# Patient Record
Sex: MALE | Race: BLACK OR AFRICAN AMERICAN | Employment: OTHER | URBAN - METROPOLITAN AREA
[De-identification: names, ages, dates, MRNs, and addresses within clinical notes are randomized per-mention and may not be internally consistent; named-entity substitution may affect disease eponyms.]

---

## 2019-02-25 ENCOUNTER — TRANSFERRED RECORDS (OUTPATIENT)
Dept: HEALTH INFORMATION MANAGEMENT | Facility: CLINIC | Age: 69
End: 2019-02-25

## 2019-03-18 ENCOUNTER — TELEPHONE (OUTPATIENT)
Dept: ONCOLOGY | Facility: CLINIC | Age: 69
End: 2019-03-18

## 2019-03-18 ENCOUNTER — ANCILLARY PROCEDURE (OUTPATIENT)
Dept: GENERAL RADIOLOGY | Facility: CLINIC | Age: 69
End: 2019-03-18
Attending: INTERNAL MEDICINE
Payer: MEDICAID

## 2019-03-18 ENCOUNTER — OFFICE VISIT (OUTPATIENT)
Dept: INTERNAL MEDICINE | Facility: CLINIC | Age: 69
End: 2019-03-18
Payer: MEDICAID

## 2019-03-18 VITALS
OXYGEN SATURATION: 90 % | DIASTOLIC BLOOD PRESSURE: 80 MMHG | TEMPERATURE: 98.8 F | HEIGHT: 66 IN | RESPIRATION RATE: 20 BRPM | BODY MASS INDEX: 33.07 KG/M2 | SYSTOLIC BLOOD PRESSURE: 136 MMHG | HEART RATE: 90 BPM | WEIGHT: 205.8 LBS

## 2019-03-18 DIAGNOSIS — Z13.6 CARDIOVASCULAR SCREENING; LDL GOAL LESS THAN 130: ICD-10-CM

## 2019-03-18 DIAGNOSIS — R06.00 DYSPNEA, UNSPECIFIED TYPE: Primary | ICD-10-CM

## 2019-03-18 DIAGNOSIS — Z12.11 COLON CANCER SCREENING: ICD-10-CM

## 2019-03-18 DIAGNOSIS — D49.7 FOLLICULAR NEOPLASM OF THYROID: ICD-10-CM

## 2019-03-18 DIAGNOSIS — Z23 NEED FOR PROPHYLACTIC VACCINATION AGAINST STREPTOCOCCUS PNEUMONIAE (PNEUMOCOCCUS): ICD-10-CM

## 2019-03-18 DIAGNOSIS — R06.00 DYSPNEA, UNSPECIFIED TYPE: ICD-10-CM

## 2019-03-18 LAB
T4 FREE SERPL-MCNC: 0.96 NG/DL (ref 0.76–1.46)
TSH SERPL DL<=0.005 MIU/L-ACNC: 0.39 MU/L (ref 0.4–4)

## 2019-03-18 PROCEDURE — 84443 ASSAY THYROID STIM HORMONE: CPT | Performed by: INTERNAL MEDICINE

## 2019-03-18 PROCEDURE — 84439 ASSAY OF FREE THYROXINE: CPT | Performed by: INTERNAL MEDICINE

## 2019-03-18 PROCEDURE — 71046 X-RAY EXAM CHEST 2 VIEWS: CPT

## 2019-03-18 PROCEDURE — 36415 COLL VENOUS BLD VENIPUNCTURE: CPT | Performed by: INTERNAL MEDICINE

## 2019-03-18 PROCEDURE — 99204 OFFICE O/P NEW MOD 45 MIN: CPT | Performed by: INTERNAL MEDICINE

## 2019-03-18 RX ORDER — ALBUTEROL SULFATE 90 UG/1
2 AEROSOL, METERED RESPIRATORY (INHALATION) EVERY 6 HOURS PRN
Qty: 8.5 G | Refills: 1 | Status: SHIPPED | OUTPATIENT
Start: 2019-03-18 | End: 2019-06-16

## 2019-03-18 SDOH — HEALTH STABILITY: MENTAL HEALTH: HOW OFTEN DO YOU HAVE A DRINK CONTAINING ALCOHOL?: NEVER

## 2019-03-18 ASSESSMENT — MIFFLIN-ST. JEOR: SCORE: 1632.28

## 2019-03-18 NOTE — TELEPHONE ENCOUNTER
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 No, internal referral    Has patient been seen for any external appt for this diagnosis (enter clinic/location)? No    ADDITIONAL INFORMATION:      Complete

## 2019-03-18 NOTE — PROGRESS NOTES
SUBJECTIVE:   Jacoby Ramos is a 69 year old male who presents to clinic today for the following health issues:    New patient, not seen prior. Patient accompanied by daughter who states patient moved here from [facility] 8+ yrs ago, has not had a routine provider since being in [place]    Due to language barrier, an  was present during the history-taking and subsequent discussion (and for part of the physical exam) with this patient.    Very poor historian.    Through the  the patient denies any prior history of asthma, COPD and has not been a smoker.  He has not been exposed to anybody with tuberculosis and has had no significant productive cough of discoloration or hemoptysis.  There is been no weight loss.  He apparently was given some form of an allergy pill in [facility] which she did not feel helped his symptoms.  He reports some symptoms that are consistent with reflux disease with belching burping and food intolerance to certain issues as well as mild symptomatology it is worse when he lays supine.    RESPIRATORY SYMPTOMS      Duration: 6 months     Description  cough, wheezing, chills, fatigue/malaise    Severity: moderate    Accompanying signs and symptoms: Spitting up a lot of phlegm     History (predisposing factors):  none    Precipitating or alleviating factors: Patient was in [place] for the last 6 months after his son passed away    Therapies tried and outcome:  Treated for allergies in [place]     Other concern:  1. Daughter also mentions that patient had thyroid surgery in [facility] many yrs ago.    Just prior to ending the clinic visit the daughter pulled out a piece of paper for which was from a physician in [place] written in English but stated that the patient apparently was seen over there and evaluated at which time a CT scan was done of his neck demonstrated a right-sided thyroid mass for which a fine-needle aspirate was done consistent with follicular neoplasm.    Problem  "list and histories reviewed & adjusted, as indicated.  Additional history: as documented    Patient Active Problem List   Diagnosis     CARDIOVASCULAR SCREENING; LDL GOAL LESS THAN 130     No past surgical history on file.    Social History     Tobacco Use     Smoking status: Not on file   Substance Use Topics     Alcohol use: Not on file     No family history on file.      No current outpatient medications on file.     Allergies not on file  BP Readings from Last 3 Encounters:   No data found for BP    Wt Readings from Last 3 Encounters:   No data found for Wt           Reviewed and updated as needed this visit by clinical staff       Reviewed and updated as needed this visit by Provider         ROS:  CONSTITUTIONAL: NEGATIVE for fever, chills, change in weight  ENT/MOUTH: NEGATIVE for ear, mouth and throat problems  RESP:  As above   CV: NEGATIVE for chest pain, palpitations or peripheral edema  GI: NEGATIVE for nausea, abdominal pain, + heartburn, or change in bowel habits  : NEGATIVE for frequency, dysuria, or hematuria  MUSCULOSKELETAL: NEGATIVE for significant arthralgias or myalgia  HEME: NEGATIVE for bleeding problems  PSYCHIATRIC: NEGATIVE for changes in mood or affect    OBJECTIVE:                                                    /80   Pulse 90   Temp 98.8  F (37.1  C) (Oral)   Resp 20   Ht 1.67 m (5' 5.75\")   Wt 93.4 kg (205 lb 12.8 oz)   SpO2 90%   BMI 33.47 kg/m    Body mass index is 33.47 kg/m .  GENERAL: alert and no distress  EYES: Eyes grossly normal to inspection, extraocular movements - intact, and PERRL  HENT: ear canals- normal; TMs- normal; Nose- normal; Mouth- no ulcers, no lesions  NECK: no tenderness, noted prominent right sided thyroid area with question mild adenopathy, no asymmetry, no stiffness; thyroid- not normal to palpation  RESP: lungs clear to auscultation - no rales, no rhonchi, no wheezes  CV: regular rates and rhythm, normal S1 S2, no S3 or S4 and no click or " rub   MS: extremities- no gross deformities noted  NEURO: no focal changes  PSYCH: Alert and oriented times 3; speech- coherent , normal rate and volume; able to articulate logical thoughts, able to abstract reason, no tangential thoughts, no hallucinations or delusions, affect- normal     ASSESSMENT/PLAN:                                                      (R06.00) Dyspnea, unspecified type  (primary encounter diagnosis)  Comment: Suspect related to below neoplasm of right sided neck.  Plan: Echocardiogram Complete, XR Chest 2 Views, TSH         with free T4 reflex, albuterol (PROAIR         HFA/PROVENTIL HFA/VENTOLIN HFA) 108 (90 Base)         MCG/ACT inhaler, omeprazole (PRILOSEC) 20 MG DR        capsule        We will rule out secondary causes.    (Z12.11) Colon cancer screening  Comment: ADVISED COLONOSCOPY FOR ROUTINE PREVENTATIVE CARE.  Plan: GASTROENTEROLOGY ADULT REF PROCEDURE ONLY         Akhil Hernandez [phone]; No Provider        Preference            (Z23) Need for prophylactic vaccination against Streptococcus pneumoniae (pneumococcus)  Comment: Vised for routine vaccination update  Plan:     (Z13.6) CARDIOVASCULAR SCREENING; LDL GOAL LESS THAN 130  Comment: Advise routine fasting labs.  Plan:     (D49.7) Follicular neoplasm of thyroid  Comment: Patient was given a referral to oncology for evaluation and further assessment.  Plan: ONC/HEME ADULT REFERRAL, I suspect that a lot of his symptoms may be related to his follicular neoplasm that may be giving him some upper airway issues.  Of note I do not have a copy of the CT scan or biopsy results that apparently were done in [place].          See Patient Instructions    Afshin Baptiste MD  [facility]

## 2019-03-19 ENCOUNTER — HOSPITAL ENCOUNTER (OUTPATIENT)
Facility: CLINIC | Age: 69
Setting detail: SPECIMEN
Discharge: HOME | End: 2019-03-19
Attending: INTERNAL MEDICINE | Admitting: INTERNAL MEDICINE
Payer: MEDICAID

## 2019-03-19 ENCOUNTER — ONCOLOGY VISIT (OUTPATIENT)
Dept: ONCOLOGY | Facility: CLINIC | Age: 69
End: 2019-03-19
Attending: INTERNAL MEDICINE
Payer: MEDICAID

## 2019-03-19 ENCOUNTER — PRE VISIT (OUTPATIENT)
Dept: ONCOLOGY | Facility: CLINIC | Age: 69
End: 2019-03-19

## 2019-03-19 ENCOUNTER — TELEPHONE (OUTPATIENT)
Dept: ONCOLOGY | Facility: CLINIC | Age: 69
End: 2019-03-19

## 2019-03-19 VITALS
BODY MASS INDEX: 32.95 KG/M2 | RESPIRATION RATE: 16 BRPM | TEMPERATURE: 98.4 F | DIASTOLIC BLOOD PRESSURE: 82 MMHG | HEART RATE: 96 BPM | HEIGHT: 66 IN | OXYGEN SATURATION: 90 % | WEIGHT: 205 LBS | SYSTOLIC BLOOD PRESSURE: 135 MMHG

## 2019-03-19 DIAGNOSIS — D49.7 FOLLICULAR NEOPLASM OF THYROID: Primary | ICD-10-CM

## 2019-03-19 LAB
ALBUMIN SERPL-MCNC: 3.7 G/DL (ref 3.4–5)
ALP SERPL-CCNC: 116 U/L (ref 40–150)
ALT SERPL W P-5'-P-CCNC: 33 U/L (ref 0–70)
ANION GAP SERPL CALCULATED.3IONS-SCNC: 6 MMOL/L (ref 3–14)
AST SERPL W P-5'-P-CCNC: 19 U/L (ref 0–45)
BASOPHILS # BLD AUTO: 0 10E9/L (ref 0–0.2)
BASOPHILS NFR BLD AUTO: 0.3 %
BILIRUB SERPL-MCNC: 0.6 MG/DL (ref 0.2–1.3)
BUN SERPL-MCNC: 11 MG/DL (ref 7–30)
CALCIUM SERPL-MCNC: 9.2 MG/DL (ref 8.5–10.1)
CHLORIDE SERPL-SCNC: 106 MMOL/L (ref 94–109)
CO2 SERPL-SCNC: 30 MMOL/L (ref 20–32)
CREAT SERPL-MCNC: 0.74 MG/DL (ref 0.66–1.25)
DIFFERENTIAL METHOD BLD: ABNORMAL
EOSINOPHIL # BLD AUTO: 0.2 10E9/L (ref 0–0.7)
EOSINOPHIL NFR BLD AUTO: 2.5 %
ERYTHROCYTE [DISTWIDTH] IN BLOOD BY AUTOMATED COUNT: 14.1 % (ref 10–15)
GFR SERPL CREATININE-BSD FRML MDRD: >90 ML/MIN/{1.73_M2}
GLUCOSE SERPL-MCNC: 120 MG/DL (ref 70–99)
HCT VFR BLD AUTO: 54 % (ref 40–53)
HGB BLD-MCNC: 18.1 G/DL (ref 13.3–17.7)
IMM GRANULOCYTES # BLD: 0 10E9/L (ref 0–0.4)
IMM GRANULOCYTES NFR BLD: 0.1 %
LYMPHOCYTES # BLD AUTO: 1.8 10E9/L (ref 0.8–5.3)
LYMPHOCYTES NFR BLD AUTO: 25.6 %
MCH RBC QN AUTO: 31 PG (ref 26.5–33)
MCHC RBC AUTO-ENTMCNC: 33.5 G/DL (ref 31.5–36.5)
MCV RBC AUTO: 93 FL (ref 78–100)
MONOCYTES # BLD AUTO: 0.5 10E9/L (ref 0–1.3)
MONOCYTES NFR BLD AUTO: 7.3 %
NEUTROPHILS # BLD AUTO: 4.6 10E9/L (ref 1.6–8.3)
NEUTROPHILS NFR BLD AUTO: 64.2 %
NRBC # BLD AUTO: 0 10*3/UL
NRBC BLD AUTO-RTO: 0 /100
PLATELET # BLD AUTO: 211 10E9/L (ref 150–450)
POTASSIUM SERPL-SCNC: 4.3 MMOL/L (ref 3.4–5.3)
PROT SERPL-MCNC: 7 G/DL (ref 6.8–8.8)
RBC # BLD AUTO: 5.83 10E12/L (ref 4.4–5.9)
SODIUM SERPL-SCNC: 142 MMOL/L (ref 133–144)
WBC # BLD AUTO: 7.1 10E9/L (ref 4–11)

## 2019-03-19 PROCEDURE — 85025 COMPLETE CBC W/AUTO DIFF WBC: CPT | Performed by: INTERNAL MEDICINE

## 2019-03-19 PROCEDURE — 86800 THYROGLOBULIN ANTIBODY: CPT | Performed by: INTERNAL MEDICINE

## 2019-03-19 PROCEDURE — 80053 COMPREHEN METABOLIC PANEL: CPT | Performed by: INTERNAL MEDICINE

## 2019-03-19 PROCEDURE — 99204 OFFICE O/P NEW MOD 45 MIN: CPT | Performed by: INTERNAL MEDICINE

## 2019-03-19 PROCEDURE — 84432 ASSAY OF THYROGLOBULIN: CPT | Performed by: INTERNAL MEDICINE

## 2019-03-19 PROCEDURE — G0463 HOSPITAL OUTPT CLINIC VISIT: HCPCS

## 2019-03-19 ASSESSMENT — MIFFLIN-ST. JEOR: SCORE: 1628.65

## 2019-03-19 ASSESSMENT — PAIN SCALES - GENERAL: PAINLEVEL: NO PAIN (0)

## 2019-03-19 NOTE — PATIENT INSTRUCTIONS
CT scan scheduled  Sandra will call patient daughter Jackie [phone]  to schedule ENT and Endocrinology Appts

## 2019-03-19 NOTE — TELEPHONE ENCOUNTER
RECORDS STATUS - ALL OTHER DIAGNOSIS      RECORDS RECEIVED FROM: [facility] - in [facility] (internal referral) *Previous records from [facility] unavailable*   DATE RECEIVED: 3/18/19   NOTES STATUS DETAILS   OFFICE NOTE from referring provider 3/18/19 In [facility]     OFFICE NOTE from medical oncologist None None     DISCHARGE SUMMARY from hospital None None     DISCHARGE REPORT from the ER None None     OPERATIVE REPORT None None     MEDICATION LIST In Epic In [facility]     CLINICAL TRIAL TREATMENTS TO DATE None None     LABS     PATHOLOGY REPORTS In [facility] Not available     ANYTHING RELATED TO DIAGNOSIS None None     GENONOMIC TESTING     TYPE: None None   IMAGING (NEED IMAGES & REPORT)     OTHER 3/18/19 XR Chest In Epic   CT SCANS None None   MRI None None   MAMMO None None   ULTRASOUND None None   PET None None

## 2019-03-19 NOTE — PROGRESS NOTES
"Oncology Rooming Note    March 19, 2019 11:06 AM   Jacoby Ramos is a 69 year old male who presents for:    Chief Complaint   Patient presents with     Oncology Clinic Visit     Initial Vitals: /82 (BP Location: Left arm, Patient Position: Sitting, Cuff Size: Adult Regular)   Pulse 96   Temp 98.4  F (36.9  C) (Oral)   Resp 16   Ht 1.67 m (5' 5.75\")   Wt 93 kg (205 lb)   SpO2 90%   BMI 33.34 kg/m   Estimated body mass index is 33.34 kg/m  as calculated from the following:    Height as of this encounter: 1.67 m (5' 5.75\").    Weight as of this encounter: 93 kg (205 lb). Body surface area is 2.08 meters squared.  No Pain (0) Comment: Data Unavailable   No LMP for male patient.  Allergies reviewed: Yes  Medications reviewed: Yes    Medications: Medication refills not needed today.  Pharmacy name entered into EPIC: Data Unavailable    Clinical concerns: no      Medical Assistant Note:  Jacoby Ramos presents today for lab draw.    Patient seen by provider today: Yes: Dr Alamo.   present during visit today: Not Applicable.    Concerns: No Concerns.    Procedure:  Lab draw site: LAC, Needle type: BF, Gauge: 21. Sarah and coban applied    Post Assessment:  Labs drawn without difficulty: Yes.    Discharge Plan:  Departure Mode: Ambulatory.    Face to Face Time: 5.    Juana Velasco MA"

## 2019-03-19 NOTE — TELEPHONE ENCOUNTER
Called to ask family if they need an . Daughter said yes but they have had an  in the past. Also contact  services to find one had been requested and is pending. Will need to use  services phone during today's appointment pending an  doesn't become available by 11am appointment.   Pt's daughter does speak English and will be with him today.

## 2019-03-19 NOTE — TELEPHONE ENCOUNTER
Message left for patient daughter Jenn- CT scan of Neck did not initially get scheduled.  CT appointment had to be moved to 5:00pm 3-19-19

## 2019-03-19 NOTE — LETTER
3/19/2019         RE: Jacoby Ramos  [address]  [place] [address]        Dear Colleague,    Thank you for referring your patient, Jacoby Ramos, to the [facility]. Please see a copy of my visit note below.    [facility]    Hematology/Oncology New Patient Note      Today's Date: 03/19/19    Reason for Consult: follicular neoplasm of thyroid      HISTORY OF PRESENT ILLNESS: Jacoby Ramos is a 69 year old male who presents with follicular neoplasm of thyroid.   His daughter and son are with him, as well as .  They report that the patient moved here from [place] recently.  Patient is a poor historian.  His daughter brings in a letter from [place] by Dr. Stanislaw Tamayo dated 2/25/19, that says the patient was seen in the hospital complaining of hoarseness and difficulty breathing at night.  He underwent subtotal thyroidectomy 5 years ago, but these complaints are new.  He has an anterior neck mass to the right side of midline, firm, non-tender, measuring 5 x 5 cm.  CT scan showed right-sided thyroid mass with retrosternal extension.  FNA from the neck mass is suggestive of follicular neoplasm.  Indirect laryngoscopy showed normally functioning vocal cords.  He was advised to undergo surgery for definitive diagnosis and treatment.      We do not have records of the CT scan or report or the biopsy results that were done in [place].      He denies smoking history.      He just established care with a PCP yesterday.         REVIEW OF SYSTEMS:   14 point ROS was reviewed and is negative other than as noted above in HPI.       HOME MEDICATIONS:  Current Outpatient Medications   Medication Sig Dispense Refill     albuterol (PROAIR HFA/PROVENTIL HFA/VENTOLIN HFA) 108 (90 Base) MCG/ACT inhaler Inhale 2 puffs into the lungs every 6 hours as needed for shortness of breath / dyspnea or wheezing 8.5 g 1     omeprazole (PRILOSEC) 20 MG DR capsule Take 1 capsule  "(20 mg) by mouth daily 30 capsule 1         ALLERGIES:  Not on File      PAST MEDICAL HISTORY:  No past medical history on file.      PAST SURGICAL HISTORY:  No past surgical history on file.      SOCIAL HISTORY:  Social History     Socioeconomic History     Marital status:      Spouse name: Not on file     Number of children: Not on file     Years of education: Not on file     Highest education level: Not on file   Occupational History     Not on file   Social Needs     Financial resource strain: Not on file     Food insecurity:     Worry: Not on file     Inability: Not on file     Transportation needs:     Medical: Not on file     Non-medical: Not on file   Tobacco Use     Smoking status: Never Smoker     Smokeless tobacco: Never Used   Substance and Sexual Activity     Alcohol use: No     Frequency: Never     Drug use: No     Sexual activity: Yes     Partners: Female   Lifestyle     Physical activity:     Days per week: Not on file     Minutes per session: Not on file     Stress: Not on file   Relationships     Social connections:     Talks on phone: Not on file     Gets together: Not on file     Attends Muslim service: Not on file     Active member of club or organization: Not on file     Attends meetings of clubs or organizations: Not on file     Relationship status: Not on file     Intimate partner violence:     Fear of current or ex partner: Not on file     Emotionally abused: Not on file     Physically abused: Not on file     Forced sexual activity: Not on file   Other Topics Concern     Not on file   Social History Narrative     Not on file         FAMILY HISTORY:  No family history on file.      PHYSICAL EXAM:  Vital signs:  /82 (BP Location: Left arm, Patient Position: Sitting, Cuff Size: Adult Regular)   Pulse 96   Temp 98.4  F (36.9  C) (Oral)   Resp 16   Ht 1.67 m (5' 5.75\")   Wt 93 kg (205 lb)   SpO2 90%   BMI 33.34 kg/m      ECO  GENERAL/CONSTITUTIONAL: No acute distress. " Accompanied by daughter, son, and .  EYES: No scleral icterus.  ENT/MOUTH: Large right-sided neck mass.  RESPIRATORY: Clear to auscultation bilaterally. No crackles or wheezing.   CARDIOVASCULAR: Regular rate and rhythm without murmurs, gallops, or rubs.  GASTROINTESTINAL: No tenderness. The patient has normal bowel sounds. No guarding.   MUSCULOSKELETAL: Warm and well-perfuse.  NEUROLOGIC: Alert, oriented, answers questions appropriately.      LABS:  CBC RESULTS:   Recent Labs   Lab Test 03/19/19  1135   WBC 7.1   RBC 5.83   HGB 18.1*   HCT 54.0*   MCV 93   MCH 31.0   MCHC 33.5   RDW 14.1          Recent Labs   Lab Test 03/19/19  1135      POTASSIUM 4.3   CHLORIDE 106   CO2 30   ANIONGAP 6   *   BUN 11   CR 0.74   TRAVIS 9.2         PATHOLOGY:  Not available.      IMAGING:  Not available.      ASSESSMENT/PLAN:  Jacoby Ramos is a 69 year old male with:    1) Right neck mass: The patient has a letter from [place] indicating that he had CT scan that showed right-sided thyroid mass with retrosternal extension, and FNA is suggestive of follicular neoplasm.  We do not have these records, so we will need to repeat the testing.  The patient and family expressed understanding.    -will obtain CT neck/chest/abdomen/pelvis  -labs today, including CBC, CMP, and thyroglobulin and Ab.  TSH and free T4 were checked yesterday.    -will refer to ENT  -will refer to endocrinology  -will follow-up after the above testing and referrals are complete      I spent a total of 45 minutes with the patient, with over >50% of the time in counseling and/or coordination of care.       Fara Alamo MD  Hematology/Oncology  [facility]      Oncology Rooming Note    March 19, 2019 11:06 AM   Jacoby Ramos is a 69 year old male who presents for:    Chief Complaint   Patient presents with     Oncology Clinic Visit     Initial Vitals: /82 (BP Location: Left arm, Patient Position:  "Sitting, Cuff Size: Adult Regular)   Pulse 96   Temp 98.4  F (36.9  C) (Oral)   Resp 16   Ht 1.67 m (5' 5.75\")   Wt 93 kg (205 lb)   SpO2 90%   BMI 33.34 kg/m    Estimated body mass index is 33.34 kg/m  as calculated from the following:    Height as of this encounter: 1.67 m (5' 5.75\").    Weight as of this encounter: 93 kg (205 lb). Body surface area is 2.08 meters squared.  No Pain (0) Comment: Data Unavailable   No LMP for male patient.  Allergies reviewed: Yes  Medications reviewed: Yes    Medications: Medication refills not needed today.  Pharmacy name entered into EPIC: Data Unavailable    Clinical concerns: no      Medical Assistant Note:  Jacoby Ramos presents today for lab draw.    Patient seen by provider today: Yes: Dr Alamo.   present during visit today: Not Applicable.    Concerns: No Concerns.    Procedure:  Lab draw site: LAC, Needle type: BF, Gauge: 21. Guaze and coban applied    Post Assessment:  Labs drawn without difficulty: Yes.    Discharge Plan:  Departure Mode: Ambulatory.    Face to Face Time: 5.    Juana Velasco MA                                Again, thank you for allowing me to participate in the care of your patient.        Sincerely,        Fara Alamo MD    "

## 2019-03-19 NOTE — PROGRESS NOTES
[facility]    Hematology/Oncology New Patient Note      Today's Date: 03/19/19    Reason for Consult: follicular neoplasm of thyroid      HISTORY OF PRESENT ILLNESS: Jacoby Ramos is a 69 year old male who presents with follicular neoplasm of thyroid.   His daughter and son are with him, as well as .  They report that the patient moved here from [place] recently.  Patient is a poor historian.  His daughter brings in a letter from [place] by Dr. Stanislaw Tamayo dated 2/25/19, that says the patient was seen in the hospital complaining of hoarseness and difficulty breathing at night.  He underwent subtotal thyroidectomy 5 years ago, but these complaints are new.  He has an anterior neck mass to the right side of midline, firm, non-tender, measuring 5 x 5 cm.  CT scan showed right-sided thyroid mass with retrosternal extension.  FNA from the neck mass is suggestive of follicular neoplasm.  Indirect laryngoscopy showed normally functioning vocal cords.  He was advised to undergo surgery for definitive diagnosis and treatment.      We do not have records of the CT scan or report or the biopsy results that were done in [place].      He denies smoking history.      He just established care with a PCP yesterday.         REVIEW OF SYSTEMS:   14 point ROS was reviewed and is negative other than as noted above in HPI.       HOME MEDICATIONS:  Current Outpatient Medications   Medication Sig Dispense Refill     albuterol (PROAIR HFA/PROVENTIL HFA/VENTOLIN HFA) 108 (90 Base) MCG/ACT inhaler Inhale 2 puffs into the lungs every 6 hours as needed for shortness of breath / dyspnea or wheezing 8.5 g 1     omeprazole (PRILOSEC) 20 MG DR capsule Take 1 capsule (20 mg) by mouth daily 30 capsule 1         ALLERGIES:  Not on File      PAST MEDICAL HISTORY:  No past medical history on file.      PAST SURGICAL HISTORY:  No past surgical history on file.      SOCIAL HISTORY:  Social History  "    Socioeconomic History     Marital status:      Spouse name: Not on file     Number of children: Not on file     Years of education: Not on file     Highest education level: Not on file   Occupational History     Not on file   Social Needs     Financial resource strain: Not on file     Food insecurity:     Worry: Not on file     Inability: Not on file     Transportation needs:     Medical: Not on file     Non-medical: Not on file   Tobacco Use     Smoking status: Never Smoker     Smokeless tobacco: Never Used   Substance and Sexual Activity     Alcohol use: No     Frequency: Never     Drug use: No     Sexual activity: Yes     Partners: Female   Lifestyle     Physical activity:     Days per week: Not on file     Minutes per session: Not on file     Stress: Not on file   Relationships     Social connections:     Talks on phone: Not on file     Gets together: Not on file     Attends Latter day service: Not on file     Active member of club or organization: Not on file     Attends meetings of clubs or organizations: Not on file     Relationship status: Not on file     Intimate partner violence:     Fear of current or ex partner: Not on file     Emotionally abused: Not on file     Physically abused: Not on file     Forced sexual activity: Not on file   Other Topics Concern     Not on file   Social History Narrative     Not on file         FAMILY HISTORY:  No family history on file.      PHYSICAL EXAM:  Vital signs:  /82 (BP Location: Left arm, Patient Position: Sitting, Cuff Size: Adult Regular)   Pulse 96   Temp 98.4  F (36.9  C) (Oral)   Resp 16   Ht 1.67 m (5' 5.75\")   Wt 93 kg (205 lb)   SpO2 90%   BMI 33.34 kg/m     ECO  GENERAL/CONSTITUTIONAL: No acute distress. Accompanied by daughter, son, and .  EYES: No scleral icterus.  ENT/MOUTH: Large right-sided neck mass.  RESPIRATORY: Clear to auscultation bilaterally. No crackles or wheezing.   CARDIOVASCULAR: Regular rate and rhythm " without murmurs, gallops, or rubs.  GASTROINTESTINAL: No tenderness. The patient has normal bowel sounds. No guarding.   MUSCULOSKELETAL: Warm and well-perfuse.  NEUROLOGIC: Alert, oriented, answers questions appropriately.      LABS:  CBC RESULTS:   Recent Labs   Lab Test 03/19/19  1135   WBC 7.1   RBC 5.83   HGB 18.1*   HCT 54.0*   MCV 93   MCH 31.0   MCHC 33.5   RDW 14.1          Recent Labs   Lab Test 03/19/19  1135      POTASSIUM 4.3   CHLORIDE 106   CO2 30   ANIONGAP 6   *   BUN 11   CR 0.74   TRAVIS 9.2         PATHOLOGY:  Not available.      IMAGING:  Not available.      ASSESSMENT/PLAN:  Jacoby Ramos is a 69 year old male with:    1) Right neck mass: The patient has a letter from [place] indicating that he had CT scan that showed right-sided thyroid mass with retrosternal extension, and FNA is suggestive of follicular neoplasm.  We do not have these records, so we will need to repeat the testing.  The patient and family expressed understanding.    -will obtain CT neck/chest/abdomen/pelvis  -labs today, including CBC, CMP, and thyroglobulin and Ab.  TSH and free T4 were checked yesterday.    -will refer to ENT  -will refer to endocrinology  -will follow-up after the above testing and referrals are complete      I spent a total of 45 minutes with the patient, with over >50% of the time in counseling and/or coordination of care.       Fara Alamo MD  Hematology/Oncology  [facility]

## 2019-03-20 ENCOUNTER — HOSPITAL ENCOUNTER (OUTPATIENT)
Dept: CARDIOLOGY | Facility: CLINIC | Age: 69
Discharge: HOME | End: 2019-03-20
Attending: INTERNAL MEDICINE | Admitting: INTERNAL MEDICINE
Payer: MEDICAID

## 2019-03-20 ENCOUNTER — HOSPITAL ENCOUNTER (OUTPATIENT)
Dept: CT IMAGING | Facility: CLINIC | Age: 69
End: 2019-03-20
Attending: INTERNAL MEDICINE
Payer: MEDICAID

## 2019-03-20 DIAGNOSIS — D49.7 FOLLICULAR NEOPLASM OF THYROID: ICD-10-CM

## 2019-03-20 DIAGNOSIS — R06.00 DYSPNEA, UNSPECIFIED TYPE: ICD-10-CM

## 2019-03-20 PROCEDURE — 40000264 ECHOCARDIOGRAM COMPLETE

## 2019-03-20 PROCEDURE — 74177 CT ABD & PELVIS W/CONTRAST: CPT

## 2019-03-20 PROCEDURE — 25000125 ZZHC RX 250: Performed by: INTERNAL MEDICINE

## 2019-03-20 PROCEDURE — 71260 CT THORAX DX C+: CPT

## 2019-03-20 PROCEDURE — 70491 CT SOFT TISSUE NECK W/DYE: CPT

## 2019-03-20 PROCEDURE — 25500064 ZZH RX 255 OP 636: Performed by: INTERNAL MEDICINE

## 2019-03-20 PROCEDURE — 93306 TTE W/DOPPLER COMPLETE: CPT | Mod: 26 | Performed by: INTERNAL MEDICINE

## 2019-03-20 PROCEDURE — 25000128 H RX IP 250 OP 636: Performed by: INTERNAL MEDICINE

## 2019-03-20 RX ORDER — IOPAMIDOL 755 MG/ML
125 INJECTION, SOLUTION INTRAVASCULAR ONCE
Status: COMPLETED | OUTPATIENT
Start: 2019-03-20 | End: 2019-03-20

## 2019-03-20 RX ADMIN — SODIUM CHLORIDE 70 ML: 9 INJECTION, SOLUTION INTRAVENOUS at 17:12

## 2019-03-20 RX ADMIN — IOPAMIDOL 125 ML: 755 INJECTION, SOLUTION INTRAVENOUS at 17:12

## 2019-03-20 RX ADMIN — HUMAN ALBUMIN MICROSPHERES AND PERFLUTREN 9 ML: 10; .22 INJECTION, SOLUTION INTRAVENOUS at 13:32

## 2019-03-21 DIAGNOSIS — R22.1 MASS OF RIGHT SIDE OF NECK: Primary | ICD-10-CM

## 2019-03-21 NOTE — TELEPHONE ENCOUNTER
FUTURE VISIT INFORMATION      FUTURE VISIT INFORMATION:    Date: 4/8/19    Time: 4:00 pm    Location: [facility]  REFERRAL INFORMATION:    Referring provider:  Dr. Fara Alamo    Referring providers clinic:  [facility]    Reason for visit/diagnosis  Con-follicular neoplasm of thyroid    RECORDS REQUESTED FROM:       Clinic name Comments Records Status Imaging Status   [facility] Office Visit-3/18/19 Dr. Afshin Baptiste [facility] Office/Referral visit 3/19/19 Dr. Fara Alamo [facility] Radiology CT Soft Tissue neck-3/20/19 [facility] PACS   [facility] lab Thyroglobulin 3/19/19  T4 Free 3/18/19 Epic                  3/21/19-Patient has records in [place] per notes.  PB

## 2019-03-22 ENCOUNTER — DOCUMENTATION ONLY (OUTPATIENT)
Dept: CARE COORDINATION | Facility: CLINIC | Age: 69
End: 2019-03-22

## 2019-03-26 ENCOUNTER — TELEPHONE (OUTPATIENT)
Dept: ENDOCRINOLOGY | Facility: CLINIC | Age: 69
End: 2019-03-26

## 2019-03-26 NOTE — TELEPHONE ENCOUNTER
[facility] Call Center    Phone Message    May a detailed message be left on voicemail: yes    Reason for Call: Other: Sandra from [facility] and Dr Fara Alamo's office is referring this pt to Endocrine for a new diagnosis of thyroid cancer. Previous records are still in [facility] and pt is unable to obtain. US, CT, Echo, has been done through  and they are waiting on the biopsy. Please call Sandra from [facility] to schedule this pt at [phone]. Thanks!     Action Taken: Message routed to:  [facility] ([facility] Endocrine

## 2019-03-27 ENCOUNTER — HOSPITAL ENCOUNTER (OUTPATIENT)
Dept: ULTRASOUND IMAGING | Facility: CLINIC | Age: 69
End: 2019-03-27
Attending: INTERNAL MEDICINE | Admitting: RADIOLOGY
Payer: MEDICAID

## 2019-03-27 ENCOUNTER — HOSPITAL ENCOUNTER (OUTPATIENT)
Facility: CLINIC | Age: 69
Discharge: HOME | End: 2019-03-27
Attending: RADIOLOGY | Admitting: RADIOLOGY
Payer: MEDICAID

## 2019-03-27 VITALS — HEART RATE: 97 BPM | SYSTOLIC BLOOD PRESSURE: 143 MMHG | DIASTOLIC BLOOD PRESSURE: 82 MMHG | RESPIRATION RATE: 16 BRPM

## 2019-03-27 DIAGNOSIS — R22.1 MASS OF RIGHT SIDE OF NECK: ICD-10-CM

## 2019-03-27 PROCEDURE — 88173 CYTOPATH EVAL FNA REPORT: CPT | Performed by: RADIOLOGY

## 2019-03-27 PROCEDURE — 88173 CYTOPATH EVAL FNA REPORT: CPT | Mod: 26 | Performed by: RADIOLOGY

## 2019-03-27 PROCEDURE — 40000863 ZZH STATISTIC RADIOLOGY XRAY, US, CT, MAR, NM

## 2019-03-27 PROCEDURE — 10005 FNA BX W/US GDN 1ST LES: CPT

## 2019-03-27 RX ORDER — NICOTINE POLACRILEX 4 MG
15-30 LOZENGE BUCCAL
Status: DISCONTINUED | OUTPATIENT
Start: 2019-03-27 | End: 2019-03-27 | Stop reason: HOSPADM

## 2019-03-27 RX ORDER — DEXTROSE MONOHYDRATE 25 G/50ML
25-50 INJECTION, SOLUTION INTRAVENOUS
Status: DISCONTINUED | OUTPATIENT
Start: 2019-03-27 | End: 2019-03-27 | Stop reason: HOSPADM

## 2019-03-27 RX ORDER — LIDOCAINE 40 MG/G
CREAM TOPICAL
Status: DISCONTINUED | OUTPATIENT
Start: 2019-03-27 | End: 2019-03-27 | Stop reason: HOSPADM

## 2019-03-27 NOTE — PROGRESS NOTES
Pre procedure plan of care reviewed with pt and  present. All questions answered and pt appears to accept and understand.

## 2019-03-27 NOTE — PROGRESS NOTES
Care Suites Discharge Summary    Discharge Criteria:   Discharge Criteria met per MD orders: Yes.   Vital signs stable.     Pt demonstrates ability to ambulate safely: Yes.  (See discharge questionnaire for additional information)    Discharge instructions & education:   Discharge instructions reviewed with patient and family. Patient verbalizes  understanding.   Additional patient education provided:  none.     Medications:   Patient will be discharging on new medications- No. Patient verbalizes reason for use, start date, and side effects NA.    Items returned to patient:   Home and hospital acquired medications returned to patient NA   Listed belongings gathered and returned to patient: Yes    Patient discharged to home ambulating with family driving.    Zulma Novak

## 2019-03-27 NOTE — TELEPHONE ENCOUNTER
Hi Dr Quiroga,    If the patient is unable to come tomorrow to see Dr Benjamin is it ok to use a EMMANUEL for this newly diagnoses thyroid cancer? Otherwise next available is 3 weeks out. Thanks.

## 2019-03-27 NOTE — PROGRESS NOTES
RADIOLOGY PROCEDURE NOTE  Patient name: Jacoby Ramos  MRN: [id number]  : 1950    Pre-procedure diagnosis: Thyroid nodule  Post-procedure diagnosis: Same    Procedure Date/Time: 2019  11:47 AM  Procedure: Fine needle aspiration  Estimated blood loss: None  Specimen(s) collected with description: Fine needle aspirate.  The patient tolerated the procedure well with no immediate complications.    See imaging dictation for procedural details and findings.    Provider name: Barry Nelson  Assistant(s):None

## 2019-03-27 NOTE — DISCHARGE INSTRUCTIONS
Thyroid Biopsy Discharge Instructions     After you go home:      You may resume your normal diet    Care of Puncture Site:      You may have mild bruising, soreness & swelling at the puncture site. This will go away in a few days    For swelling & bruising, you may use an ice pack on the site.     Activity:      You may go back to your normal activity    Avoid strenuous activity for 24 hours    Medicines:      You may resume all medications    For minor pain, you may take Acetaminophen (Tylenol) or Ibuprofen (Advil)            Call the provider who ordered this test if:      Increased redness or swelling at the site    Fluid or blood oozing from the site    Severe pain at the site    Chills or a fever greater than 101 F (38 C).    Any questions or concerns    Call  911 or go to the Emergency Room if:      Trouble breathing    Your neck swells    Bleeding that you cannot control    Severe difficulty swallowing    If you have questions call:      Brian [facility] Radiology Dept @ [phone]    The provider who performed your procedure was _____Dr Nelson____________.

## 2019-03-27 NOTE — PROGRESS NOTES
Discharge instructions reviewed with pt and family prior to discharge to home with  present.  All questions answered and pt appears to accept and understand.

## 2019-03-28 LAB — LAB SCANNED RESULT: NORMAL

## 2019-03-29 LAB — COPATH REPORT: NORMAL

## 2019-03-29 NOTE — TELEPHONE ENCOUNTER
Endocrine triage  FNAB 3/27/19 of right dominant mass has benign cytology.   Images reviewed - no tracheal compression.      To schedulers: Please schedule  for lst available endocrine within a month.  Preferred provider Junaid Benjamin Radulescu.  OK to use EMMANUEL to get him in  .     Trudi Quiroga MD  Endocrine triage

## 2019-04-05 ENCOUNTER — TELEPHONE (OUTPATIENT)
Dept: OTOLARYNGOLOGY | Facility: CLINIC | Age: 69
End: 2019-04-05

## 2019-04-05 NOTE — TELEPHONE ENCOUNTER
Called patients home using  services ([phone]) to left patient know that the appointment time with SADIE Nuno for 4/8/19 has been changed from 4:00 pm to 3:00 pm    Also tried calling emergency contact delilah Mejia and left a msg on her phone to call me.    Dorothy Allred   ENT [facility]-Op Coordinator  [phone]

## 2019-04-08 ENCOUNTER — PRE VISIT (OUTPATIENT)
Dept: OTOLARYNGOLOGY | Facility: CLINIC | Age: 69
End: 2019-04-08

## 2019-04-08 ENCOUNTER — OFFICE VISIT (OUTPATIENT)
Dept: OTOLARYNGOLOGY | Facility: CLINIC | Age: 69
End: 2019-04-08
Attending: INTERNAL MEDICINE

## 2019-04-08 VITALS
HEART RATE: 90 BPM | DIASTOLIC BLOOD PRESSURE: 95 MMHG | SYSTOLIC BLOOD PRESSURE: 142 MMHG | RESPIRATION RATE: 18 BRPM | WEIGHT: 208.3 LBS | BODY MASS INDEX: 33.88 KG/M2

## 2019-04-08 DIAGNOSIS — E04.9 THYROID GOITER: Primary | ICD-10-CM

## 2019-04-08 ASSESSMENT — PAIN SCALES - GENERAL: PAINLEVEL: MODERATE PAIN (5)

## 2019-04-08 NOTE — PROGRESS NOTES
Head and Neck Surgery Clinic Note Date: 2019    Jacoby Ramos  : 1950 MRN: [id number]       Jacoby Ramos is a  69 year old male with history of subtotal thyroidectomy 5 years ago in [place] for follicular neoplasm who presents with neck mass associated with hoarseness and difficulty breathing. Patient underwent CT scan which showed 6.2x3.1 cm right thyroid nodule on 3/20/2019. He subsequently underwent FNA which was consistent with benign nodule. Patient reports that he developed hoars voice following his surgery. He reports some choking with water but not with food. Daughter reports snoring when asleep.        Medical history:  Follicular neoplasm of the thyroid.     Surgical history:   Partial thyroidectomy     Problem list:  Patient Active Problem List    Diagnosis Date Noted     CARDIOVASCULAR SCREENING; LDL GOAL LESS THAN 130 2019     Priority: Medium       Medications:  Current Outpatient Medications   Medication Sig Dispense Refill     albuterol (PROAIR HFA/PROVENTIL HFA/VENTOLIN HFA) 108 (90 Base) MCG/ACT inhaler Inhale 2 puffs into the lungs every 6 hours as needed for shortness of breath / dyspnea or wheezing 8.5 g 1     omeprazole (PRILOSEC) 20 MG DR capsule Take 1 capsule (20 mg) by mouth daily 30 capsule 1       Allergies:  No Known Allergies    Family history:  No family history on file.      Social history:  Social History     Tobacco Use     Smoking status: Never Smoker     Smokeless tobacco: Never Used   Substance Use Topics     Alcohol use: No     Frequency: Never     No smoking or EtOH  Lives in [facility] and visits his daughter in the [place] frequently.     ROS:  A 10 point ROS was negative except as mentioned above.     Physical Examination:  BP (!) 142/95   Pulse 90   Resp 18   Wt 94.5 kg (208 lb 4.8 oz)   BMI 33.88 kg/m    General: well nourished, alert, NAD  HEENT: Normocephalic, atraumatic, right thyroid nodule soft and mobile.   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     CT 3/20  IMPRESSION:  1. Heterogeneous nodule in the right lobe of the thyroid measuring 6.2  x 3.1 cm, consistent with patient's history of follicular neoplasm.  2. No metastatic disease in the chest, abdomen or pelvis.  3. Small pericardial effusion.     FNA 3/27   Thyroid, right mass, ultrasound guided fine needle aspiration:   Benign   Consistent with a benign nodule (includes adenomatoid nodule, colloid   nodule, etc.)     TSH 0.39  T4 0.96     Assessment/Plan: 69 year old male with history of left thyroidectomy for follicular neoplasm who presents with enlarged right thyroid nodule with benign FNA findings.    -Given patient's nerve paralysis from prior operation, would not recommend surgery especially in the setting of benign pathology. .  -Instructed patient to sleep on two pillows and try to drink thin liquids with a straw and chin tucked.   -Follow up with CT scan in one year to assess progression.       Violet Santos MD  PGY-3 General Surgery  [phone]    I spent greater than 30 minutes in the care, exam and consultation of this patient (with daughter and  present) and agree with the resident's note and exam.    Suha Nuno MD

## 2019-04-08 NOTE — LETTER
2019       RE: Jacoby Ramos  [address]  [place] [address]     Dear Colleague,    Thank you for referring your patient, Jacoby Ramos, to the [facility] EAR [facility]SE [facility] THROAT at [facility]. Please see a copy of my visit note below.    Head and Neck Surgery Clinic Note Date: 2019    Jacoby Ramos  : 1950 MRN: [id number]       Jacoby Ramos is a  69 year old male with history of subtotal thyroidectomy 5 years ago in [facility]"" for follicular neoplasm who presents with neck mass associated with hoarseness and difficulty breathing. Patient underwent CT scan which showed 6.2x3.1 cm right thyroid nodule on 3/20/2019. He subsequently underwent FNA which was consistent with benign nodule. Patient reports that he developed hoars voice following his surgery. He reports some choking with water but not with food. Daughter reports snoring when asleep.        Medical history:  Follicular neoplasm of the thyroid.     Surgical history:   Partial thyroidectomy     Problem list:  Patient Active Problem List    Diagnosis Date Noted     CARDIOVASCULAR SCREENING; LDL GOAL LESS THAN 130 2019     Priority: Medium       Medications:  Current Outpatient Medications   Medication Sig Dispense Refill     albuterol (PROAIR HFA/PROVENTIL HFA/VENTOLIN HFA) 108 (90 Base) MCG/ACT inhaler Inhale 2 puffs into the lungs every 6 hours as needed for shortness of breath / dyspnea or wheezing 8.5 g 1     omeprazole (PRILOSEC) 20 MG DR capsule Take 1 capsule (20 mg) by mouth daily 30 capsule 1       Allergies:  No Known Allergies    Family history:  No family history on file.      Social history:  Social History     Tobacco Use     Smoking status: Never Smoker     Smokeless tobacco: Never Used   Substance Use Topics     Alcohol use: No     Frequency: Never     No smoking or EtOH  Lives in [place] and visits his daughter in the [place] frequently.     ROS:  A 10 point ROS was negative  except as mentioned above.     Physical Examination:  BP (!) 142/95   Pulse 90   Resp 18   Wt 94.5 kg (208 lb 4.8 oz)   BMI 33.88 kg/m     General: well nourished, alert, NAD  HEENT: Normocephalic, atraumatic, right thyroid nodule soft and mobile.   Fiberoptic exam shows paralyzed left vocal cord with incomplete apposition of vocal cords.     CT 3/20  IMPRESSION:  1. Heterogeneous nodule in the right lobe of the thyroid measuring 6.2  x 3.1 cm, consistent with patient's history of follicular neoplasm.  2. No metastatic disease in the chest, abdomen or pelvis.  3. Small pericardial effusion.     FNA 3/27   Thyroid, right mass, ultrasound guided fine needle aspiration:   Benign   Consistent with a benign nodule (includes adenomatoid nodule, colloid   nodule, etc.)     TSH 0.39  T4 0.96     Assessment/Plan: 69 year old male with history of left thyroidectomy for follicular neoplasm who presents with enlarged right thyroid nodule with benign FNA findings.    -Given patient's nerve paralysis from prior operation, would not recommend surgery especially in the setting of benign pathology. .  -Instructed patient to sleep on two pillows and try to drink thin liquids with a straw and chin tucked.   -Follow up with CT scan in one year to assess progression.       Violet Santos MD  PGY-3 General Surgery  [phone]    I spent greater than 30 minutes in the care, exam and consultation of this patient (with daughter and  present) and agree with the resident's note and exam.    Suha Nuno MD

## 2019-04-08 NOTE — NURSING NOTE
Chief Complaint   Patient presents with     Consult     neooplasm of thyroid     Lashell Graham LPN

## 2019-04-09 ENCOUNTER — ONCOLOGY VISIT (OUTPATIENT)
Dept: ONCOLOGY | Facility: CLINIC | Age: 69
End: 2019-04-09
Attending: INTERNAL MEDICINE
Payer: MEDICAID

## 2019-04-09 VITALS
SYSTOLIC BLOOD PRESSURE: 146 MMHG | TEMPERATURE: 98.2 F | BODY MASS INDEX: 33.33 KG/M2 | RESPIRATION RATE: 18 BRPM | HEIGHT: 66 IN | WEIGHT: 207.4 LBS | OXYGEN SATURATION: 92 % | DIASTOLIC BLOOD PRESSURE: 80 MMHG | HEART RATE: 98 BPM

## 2019-04-09 DIAGNOSIS — R22.1 MASS OF RIGHT SIDE OF NECK: Primary | ICD-10-CM

## 2019-04-09 PROCEDURE — G0463 HOSPITAL OUTPT CLINIC VISIT: HCPCS

## 2019-04-09 PROCEDURE — 99214 OFFICE O/P EST MOD 30 MIN: CPT | Performed by: INTERNAL MEDICINE

## 2019-04-09 RX ORDER — ACETAMINOPHEN 325 MG/1
325-650 TABLET ORAL EVERY 6 HOURS PRN
COMMUNITY
End: 2020-03-10

## 2019-04-09 ASSESSMENT — PAIN SCALES - GENERAL: PAINLEVEL: SEVERE PAIN (6)

## 2019-04-09 ASSESSMENT — MIFFLIN-ST. JEOR: SCORE: 1639.54

## 2019-04-09 NOTE — PROGRESS NOTES
"Oncology Rooming Note    April 9, 2019 9:09 AM   Jacoby Ramos is a 69 year old male who presents for:    Chief Complaint   Patient presents with     Oncology Clinic Visit     Initial Vitals: /80   Pulse 98   Temp 98.2  F (36.8  C) (Oral)   Resp 18   Ht 1.67 m (5' 5.75\")   Wt 94.1 kg (207 lb 6.4 oz)   SpO2 92%   BMI 33.73 kg/m   Estimated body mass index is 33.73 kg/m  as calculated from the following:    Height as of this encounter: 1.67 m (5' 5.75\").    Weight as of this encounter: 94.1 kg (207 lb 6.4 oz). Body surface area is 2.09 meters squared.  Severe Pain (6) Comment: Data Unavailable   No LMP for male patient.  Allergies reviewed: Yes  Medications reviewed: Yes    Medications: Medication refills not needed today.  Pharmacy name entered into EPIC: Data Unavailable    Clinical concerns: joint pain      Josefa Hou, JELLY            "

## 2019-04-09 NOTE — PROGRESS NOTES
[facility]    Hematology/Oncology Established Patient Note      Today's Date: 04/09/19    Reason for Follow-up: follicular neoplasm of thyroid      HISTORY OF PRESENT ILLNESS: Jacoby Ramos is a 69 year old male who presents with follicular neoplasm of thyroid.   His daughter and son are with him, as well as .  They report that the patient moved here from [place] recently.  Patient is a poor historian.  His daughter brings in a letter from [place] by Dr. Stanislaw Tamayo dated 2/25/19, that says the patient was seen in the hospital complaining of hoarseness and difficulty breathing at night.  He underwent subtotal thyroidectomy 10 years ago, but these complaints are new.  He has an anterior neck mass to the right side of midline, firm, non-tender, measuring 5 x 5 cm.  CT scan showed right-sided thyroid mass with retrosternal extension.  FNA from the neck mass is suggestive of follicular neoplasm.  Indirect laryngoscopy showed normally functioning vocal cords.  He was advised to undergo surgery for definitive diagnosis and treatment.      We do not have records of the CT scan or report or the biopsy results that were done in [facility].      CT neck was done here on 3/20/19, which showed a 4.6 x 5.4 x 6.5 cm mass in the right lower neck, which may or may not be part of the thyroid gland, extending into the upper mediastinum.  It was felt to be a thyroid goiter.  CT c/a/p showed heterogeneous nodule in the right lobe of the thyroid.  There is no evidence of metastatic disease in the chest, abdomen, or pelvis.  FNA was done, which showed benign nodule.        INTERIM HISTORY: Richard is here for follow-up today.  He says that he feels better.  The hoarseness is better.  He snores at night.  He has no pain.          REVIEW OF SYSTEMS:   14 point ROS was reviewed and is negative other than as noted above in HPI.       HOME MEDICATIONS:  Current Outpatient Medications   Medication Sig  Dispense Refill     acetaminophen (TYLENOL) 325 MG tablet Take 325-650 mg by mouth every 6 hours as needed for mild pain       albuterol (PROAIR HFA/PROVENTIL HFA/VENTOLIN HFA) 108 (90 Base) MCG/ACT inhaler Inhale 2 puffs into the lungs every 6 hours as needed for shortness of breath / dyspnea or wheezing 8.5 g 1     omeprazole (PRILOSEC) 20 MG DR capsule Take 1 capsule (20 mg) by mouth daily 30 capsule 1         ALLERGIES:  No Known Allergies      PAST MEDICAL HISTORY:  No past medical history on file.      PAST SURGICAL HISTORY:  No past surgical history on file.      SOCIAL HISTORY:  Social History     Socioeconomic History     Marital status:      Spouse name: Not on file     Number of children: Not on file     Years of education: Not on file     Highest education level: Not on file   Occupational History     Not on file   Social Needs     Financial resource strain: Not on file     Food insecurity:     Worry: Not on file     Inability: Not on file     Transportation needs:     Medical: Not on file     Non-medical: Not on file   Tobacco Use     Smoking status: Never Smoker     Smokeless tobacco: Never Used   Substance and Sexual Activity     Alcohol use: No     Frequency: Never     Drug use: No     Sexual activity: Yes     Partners: Female   Lifestyle     Physical activity:     Days per week: Not on file     Minutes per session: Not on file     Stress: Not on file   Relationships     Social connections:     Talks on phone: Not on file     Gets together: Not on file     Attends Taoism service: Not on file     Active member of club or organization: Not on file     Attends meetings of clubs or organizations: Not on file     Relationship status: Not on file     Intimate partner violence:     Fear of current or ex partner: Not on file     Emotionally abused: Not on file     Physically abused: Not on file     Forced sexual activity: Not on file   Other Topics Concern     Not on file   Social History Narrative  "    Not on file   He denies smoking history.        FAMILY HISTORY:  No family history on file.      PHYSICAL EXAM:  Vital signs:  /80   Pulse 98   Temp 98.2  F (36.8  C) (Oral)   Resp 18   Ht 1.67 m (5' 5.75\")   Wt 94.1 kg (207 lb 6.4 oz)   SpO2 92%   BMI 33.73 kg/m     ECO  GENERAL/CONSTITUTIONAL: No acute distress. Accompanied by daughter, son, and .  EYES: No scleral icterus.  ENT/MOUTH: Large right-sided neck mass.  NEUROLOGIC: Alert, oriented, answers questions appropriately.      LABS:  None today.      PATHOLOGY:  3/27/19:  Thyroid, right mass, ultrasound guided fine needle aspiration:   Benign   Consistent with a benign nodule (includes adenomatoid nodule, colloid   nodule, etc.)       IMAGING:  CT neck 3/20/19:  4.6 x 5.4 x 6.5 cm mass in right lower neck which may or  may not be part of the thyroid gland. This extends into the upper  mediastinum. Most likely this is a thyroid goiter. If clinically  indicated, ultrasound-guided biopsy might be helpful in further  evaluation. Less likely, this could be an unusual nonfunctioning  parathyroid adenoma or an enlarged lymph node.    CT c/a/p 3/20/19:  1. Heterogeneous nodule in the right lobe of the thyroid measuring 6.2  x 3.1 cm, consistent with patient's history of follicular neoplasm.  2. No metastatic disease in the chest, abdomen or pelvis.  3. Small pericardial effusion.        ASSESSMENT/PLAN:  Jacoby Ramos is a 69 year old male with:    1) Right neck mass: The patient has a letter from [place] indicating that he had CT scan that showed right-sided thyroid mass with retrosternal extension, and FNA is suggestive of follicular neoplasm.  We repeated CT scan here, which shows a large mass in the right lower neck.  FNA was benign.      Patient is a poor historian, but he says that the hoarseness started about 6 months ago, and the FNA showing follicular neoplasm was in 2019.  The FNA here on 3/27/19 is benign.  He saw " ENT yesterday and did not recommend surgery, in the setting of benign pathology.  Repeat CT scan was recommended for 6 months to 1 year.      -repeat CT neck in 6 months for now, but will discuss with surgery and radiology if they feel this is indeed benign and if a repeat biopsy is needed  -patient actually says that the mass doesn't really bother him and would prefer to observe for now    ADDENDUM: I discussed with surgery, Dr. Alcocer.  Patient with significant tracheal deviation, and resection should be considered.  Also, it is still unclear if this is actually a benign lesion, despite the FNA, considering the biopsy done in February 2019 in [facility] showed malignancy.  I called patient's daughter to let her know recommendation for her to see Dr. Alcocer in clinic, but there was no answer and voicemail box was full.  Patient already has appointment with him on 4/16/19.    2) Joint aches: No evidence of metastatic disease on scans, unlikely to be related to the neck mass  -he will follow-up with his PCP      I spent a total of 25 minutes with the patient, with over >50% of the time in counseling and/or coordination of care.       Fara Alamo MD  Hematology/Oncology  [facility]

## 2019-04-11 ENCOUNTER — TELEPHONE (OUTPATIENT)
Dept: ONCOLOGY | Facility: CLINIC | Age: 69
End: 2019-04-11

## 2019-04-11 NOTE — TELEPHONE ENCOUNTER
Called patient's daughter Jackie, attempted to leave  message that patient needs to see surgeon Dr. Alcocer. Unable to leave message on her voicemail, voice mail is full. Will attempt to call at a later time. Zarina Rodriguez RN,BSN,OCN

## 2019-04-16 ENCOUNTER — OFFICE VISIT (OUTPATIENT)
Dept: SURGERY | Facility: CLINIC | Age: 69
End: 2019-04-16
Payer: MEDICAID

## 2019-04-16 VITALS
HEIGHT: 68 IN | SYSTOLIC BLOOD PRESSURE: 130 MMHG | DIASTOLIC BLOOD PRESSURE: 74 MMHG | WEIGHT: 207 LBS | BODY MASS INDEX: 31.37 KG/M2 | HEART RATE: 100 BPM | RESPIRATION RATE: 16 BRPM | OXYGEN SATURATION: 96 %

## 2019-04-16 DIAGNOSIS — J38.00 VOCAL CORD PARALYSIS: ICD-10-CM

## 2019-04-16 DIAGNOSIS — E04.2 NON-TOXIC MULTINODULAR GOITER: ICD-10-CM

## 2019-04-16 PROCEDURE — 99203 OFFICE O/P NEW LOW 30 MIN: CPT | Performed by: SURGERY

## 2019-04-16 ASSESSMENT — MIFFLIN-ST. JEOR: SCORE: 1669.48

## 2019-04-16 NOTE — PROGRESS NOTES
Surgery Consultation, [facility], KIRA Alcocer MD    Jacoby Ramos MRN# [id number]   YOB: 1950 Age: 69 year old     PCP:  Afshin Baptiste [phone]    Chief Complaint: Hoarseness, thyroid goiter    Pt was seen in consultation from Afshin Baptiste.    History of Present Illness:  Jacoby Ramos is a 69 year old male who presented with an enlarged thyroid.  Patient is from [facility] and has been living here for a short time.  He presents to his primary clinic with complaints of voice changes and some occasional coughing and difficulty swallowing.  He has a known history of thyroid goiter.  According to him and his niece, he underwent a partial thyroidectomy in [facility] approximately 10 years ago.  A left partial thyroidectomy was performed but it was thought that the recurrent laryngeal nerve was damaged on that side.  Apparently final diagnosis was consistent with a follicular neoplasm.  No further surgery was performed.  He had not had any progression in his symptoms but was noted to have an enlarged goiter on clinical exam.  CT scan of the neck was performed and this revealed a very large goiter which extended to the thoracic inlet on the right.  Appear to be comprised of 2 prominent nodules.  Biopsy of this area suggested a benign colloid nodule.  Imaging studies show some tracheal deviation of 1-2 cm but no tracheal compression.  He was seen by Dr. Nuno at the [facility] for this and flexible laryngoscopy was performed.  It was confirmed that he had a left vocal cord paralysis.  He is here for an additional opinion.    PMH:  Jacoby Ramos  has no past medical history on file.  PSH:  Jacoby Ramos  has no past surgical history on file.    Home medications and allergies reviewed.    Social History:  Jacoby Ramos  reports that he has never smoked. He has never used smokeless tobacco. He reports that he does not drink alcohol or use drugs.  Family  "History:  Jacoby Ramos Family history is unknown by patient.    ROS:  The 10 point Review of Systems is negative other than noted in the HPI.  No fevers or chills.  Occasional coughing issues and difficulty swallowing thin liquids.  No breathing issues.    Physical Exam:  Blood pressure 130/74, pulse 100, resp. rate 16, height 1.721 m (5' 7.75\"), weight 93.9 kg (207 lb), SpO2 96 %.  207 lbs 0 oz  Overweight [place] gentleman in no distress.    Patient has a pleasant affect and communicates with a raspy voice quality.  Pupils equal round and reactive to light.   No cervical lymphadenopathy.  Well-healed transverse cervical scar.  Large vague area of fullness in the right neck extends down to the right clavicle.  Poor mobilization with swallowing.  Relatively limited neck range of motion.  Lung fields clear, breathing comfortably.   Heart normal sinus rhythm.  No murmurs rubs or gallops.  Abdomen soft, nontender, nondistended.  Skin warm, dry.  No obvious rashes or lesions.    All new lab and imaging data was reviewed.  This includes personal review of the neck CT scan and lab values.     Assessment/plan: Pleasant 69-year-old [place] gentleman with a large thyroid goiter on the right.  His picture is complicated by the vocal cord paralysis on the left.  This certainly makes the risks of surgery greater, as injury to the vocal cord in the right could cause an airway emergency or even mandate tracheostomy.  Given his relative lack of new symptoms, I agree with the opinion rendered at the [facility] about not rushing into surgery.  His thyroid function is currently normal.  Agree that getting an imaging study, either ultrasound or CT scan, in 6-12 months would serve to follow this lesion for any signs of tracheal compression.  In addition, if he were to develop any new symptoms, these would need to be evaluated further.  This was explained to the patient and his daughter with the aid of an .  Surgical " co-morbities include previous neck surgery, vocal cord paralysis.    Akira Alcocer M.D.  [facility]  [phone]    Please route or send letter to:  Primary Care Provider (PCP) and Referring Provider

## 2020-03-10 ENCOUNTER — OFFICE VISIT (OUTPATIENT)
Dept: INTERNAL MEDICINE | Facility: CLINIC | Age: 70
End: 2020-03-10
Payer: COMMERCIAL

## 2020-03-10 VITALS
WEIGHT: 205.3 LBS | RESPIRATION RATE: 15 BRPM | HEIGHT: 68 IN | DIASTOLIC BLOOD PRESSURE: 84 MMHG | BODY MASS INDEX: 31.11 KG/M2 | OXYGEN SATURATION: 90 % | TEMPERATURE: 98.5 F | HEART RATE: 85 BPM | SYSTOLIC BLOOD PRESSURE: 136 MMHG

## 2020-03-10 DIAGNOSIS — E04.2 NON-TOXIC MULTINODULAR GOITER: ICD-10-CM

## 2020-03-10 DIAGNOSIS — Z13.6 CARDIOVASCULAR SCREENING; LDL GOAL LESS THAN 130: Primary | ICD-10-CM

## 2020-03-10 DIAGNOSIS — G89.29 CHRONIC PAIN OF BOTH KNEES: ICD-10-CM

## 2020-03-10 DIAGNOSIS — K21.9 GASTROESOPHAGEAL REFLUX DISEASE WITHOUT ESOPHAGITIS: ICD-10-CM

## 2020-03-10 DIAGNOSIS — M25.561 CHRONIC PAIN OF BOTH KNEES: ICD-10-CM

## 2020-03-10 DIAGNOSIS — Z12.11 COLON CANCER SCREENING: ICD-10-CM

## 2020-03-10 DIAGNOSIS — Z00.00 MEDICARE ANNUAL WELLNESS VISIT, SUBSEQUENT: ICD-10-CM

## 2020-03-10 DIAGNOSIS — Z11.59 ENCOUNTER FOR HCV SCREENING TEST FOR LOW RISK PATIENT: ICD-10-CM

## 2020-03-10 DIAGNOSIS — M25.562 CHRONIC PAIN OF BOTH KNEES: ICD-10-CM

## 2020-03-10 DIAGNOSIS — Z12.5 SCREENING PSA (PROSTATE SPECIFIC ANTIGEN): ICD-10-CM

## 2020-03-10 PROCEDURE — G0009 ADMIN PNEUMOCOCCAL VACCINE: HCPCS | Performed by: INTERNAL MEDICINE

## 2020-03-10 PROCEDURE — 90732 PPSV23 VACC 2 YRS+ SUBQ/IM: CPT | Performed by: INTERNAL MEDICINE

## 2020-03-10 PROCEDURE — 99214 OFFICE O/P EST MOD 30 MIN: CPT | Mod: 25 | Performed by: INTERNAL MEDICINE

## 2020-03-10 PROCEDURE — 90662 IIV NO PRSV INCREASED AG IM: CPT | Performed by: INTERNAL MEDICINE

## 2020-03-10 PROCEDURE — G0439 PPPS, SUBSEQ VISIT: HCPCS | Performed by: INTERNAL MEDICINE

## 2020-03-10 PROCEDURE — G0008 ADMIN INFLUENZA VIRUS VAC: HCPCS | Performed by: INTERNAL MEDICINE

## 2020-03-10 ASSESSMENT — MIFFLIN-ST. JEOR: SCORE: 1656.76

## 2020-03-10 NOTE — PROGRESS NOTES
"Subjective     Jacoby Ramos is a 70 year old male who presents to clinic today for the following health issues:    HPI     Due to language barrier, an  was present during the history-taking and subsequent discussion (and for part of the physical exam) with this patient.    Patient states he is here for a general check-up, no other concerns    Annual Wellness Visit    Are you in the first 12 months of your Medicare Part B coverage?  No    Physical Health:    In general, how would you rate your overall physical health? good    Outside of work, how many days during the week do you exercise?6-7 days/week    Outside of work, approximately how many minutes a day do you exercise?greater than 60 minutes    If you drink alcohol do you typically have >3 drinks per day or >7 drinks per week? No    Do you usually eat at least 4 servings of fruit and vegetables a day, include whole grains & fiber and avoid regularly eating high fat or \"junk\" foods? Yes    Do you have any problems taking medications regularly? No    Do you have any side effects from medications? none    Needs assistance for the following daily activities: no assistance needed    Which of the following safety concerns are present in your home?  none identified     Hearing impairment: No    In the past 6 months, have you been bothered by leaking of urine? no    Mental Health:    In general, how would you rate your overall mental or emotional health? good  PHQ-2 Score: 0    Do you feel safe in your environment? Yes    Have you ever done Advance Care Planning? (For example, a Health Directive, POLST, or a discussion with a medical provider or your loved ones about your wishes)?     Fall risk:  Fallen 2 or more times in the past year?: No  Any fall with injury in the past year?: No    Cognitive Screening: Unable to perform due to language barrier    Do you have sleep apnea, excessive snoring or daytime drowsiness?: no    Current providers sharing in care " for this patient include:   Patient Care Team:  Afshin Baptiste MD as PCP - General (Internal Medicine)  Afshin Baptiste MD as Assigned PCP          Patient Active Problem List   Diagnosis     CARDIOVASCULAR SCREENING; LDL GOAL LESS THAN 130     Non-toxic multinodular goiter     Vocal cord paralysis     No past surgical history on file.    Social History     Tobacco Use     Smoking status: Never Smoker     Smokeless tobacco: Never Used   Substance Use Topics     Alcohol use: No     Frequency: Never     Family History   Family history unknown: Yes         Current Outpatient Medications   Medication Sig Dispense Refill     acetaminophen (TYLENOL) 325 MG tablet Take 325-650 mg by mouth every 6 hours as needed for mild pain       albuterol (PROAIR HFA/PROVENTIL HFA/VENTOLIN HFA) 108 (90 Base) MCG/ACT inhaler Inhale 2 puffs into the lungs every 6 hours as needed for shortness of breath / dyspnea or wheezing 8.5 g 1     omeprazole (PRILOSEC) 20 MG DR capsule Take 1 capsule (20 mg) by mouth daily 30 capsule 1     No Known Allergies  BP Readings from Last 3 Encounters:   04/16/19 130/74   04/09/19 146/80   04/08/19 (!) 142/95    Wt Readings from Last 3 Encounters:   04/16/19 93.9 kg (207 lb)   04/09/19 94.1 kg (207 lb 6.4 oz)   04/08/19 94.5 kg (208 lb 4.8 oz)           Reviewed and updated as needed this visit by Provider         Review of Systems   ROS COMP: CONSTITUTIONAL: NEGATIVE for fever, chills, change in weight  ENT/MOUTH: NEGATIVE for ear, mouth and throat problems has recently been seen in regards to his thyroid mass that was evaluated not only in the oncology with the surgery clinic with a recommendation of following for any evidence of tracheal compression with a repeat imaging study in 6 months  RESP: NEGATIVE for significant cough or SOB  CV: NEGATIVE for chest pain, palpitations or peripheral edema  GI: NEGATIVE for nausea, abdominal pain, heartburn, or change in bowel habits  : NEGATIVE for frequency,  "dysuria, or hematuria  MUSCULOSKELETAL: + for significant arthralgias or myalgia of knees with prior injection in past at [facility] 1 year ago.  NEURO: NEGATIVE for weakness, dizziness or paresthesias  PSYCHIATRIC: NEGATIVE for changes in mood or affect      Objective    /84   Pulse 85   Temp 98.5  F (36.9  C) (Oral)   Resp 15   Ht 1.721 m (5' 7.75\")   Wt 93.1 kg (205 lb 4.8 oz)   SpO2 90%   BMI 31.45 kg/m    Body mass index is 31.45 kg/m .  Physical Exam   GENERAL: alert and no distress  EYES: Eyes grossly normal to inspection, + cataracts each eye, PERRL and conjunctivae and sclerae normal  HENT: ear canals and TM's normal, nose and mouth without ulcers or lesions  NECK:  noted goiter mass neck  RESP: lungs clear to auscultation - no rales, rhonchi or wheezes  CV: regular rate and rhythm, normal S1 S2, no S3 or S4, no murmur, click or rub, no peripheral edema and peripheral pulses strong  ABDOMEN: obese  MS: no gross musculoskeletal defects noted, no edema  NEURO: No focal changes  PSYCH: mentation appears normal, affect normal/bright          Assessment & Plan     1. CARDIOVASCULAR SCREENING; LDL GOAL LESS THAN 130  Labs ordered as fasting per routine  - Lipid Profile; Future    2. Chronic pain of both knees  Suggest follow-up with orthopedic evaluation for benefit of injections and/or surgical manipulation  - Orthopedic & Spine  Referral; Future    3. Non-toxic multinodular goiter  Stable and following up with 6 months recommendation for repeat CT scan.    4. Medicare annual wellness visit, subsequent  Advised updated pneumonia vaccination, influenza vaccination, tetanus booster and shingles vaccination as well as fasting lab  - ADMIN PNEUMOCOCCAL VACCINE (MEDICARE)  - ADMIN INFLUENZA VIRUS VAC (MEDICARE)  - Pneumococcal vaccine 23 valent PPSV23  (Pneumovax) [88465]  - INFLUENZA (HIGH DOSE) 3 VALENT VACCINE [80138]  - Hemoglobin; Future  - Comprehensive metabolic panel; Future  - " "Lipid Profile; Future    5. Gastroesophageal reflux disease without esophagitis  Stable on therapy continue with current medical management  - omeprazole (PRILOSEC) 20 MG DR capsule; Take 1 capsule (20 mg) by mouth daily  Dispense: 90 capsule; Refill: 3    6. Screening PSA (prostate specific antigen)  Ordered as routine screening based on upcoming lab work.  - PSA, screen; Future    7. Encounter for HCV screening test for low risk patient  It is routine screening based on age.  - Hepatitis C Screen Reflex to HCV RNA Quant and Genotype; Future    8. Colon cancer screening  ADVISED COLONOSCOPY FOR ROUTINE PREVENTATIVE CARE.  - GASTROENTEROLOGY ADULT REF PROCEDURE ONLY Akhil Smithierge [phone]     BMI:   Estimated body mass index is 31.71 kg/m  as calculated from the following:    Height as of 4/16/19: 1.721 m (5' 7.75\").    Weight as of 4/16/19: 93.9 kg (207 lb).   Weight management plan: Discussed healthy diet and exercise guidelines    See Patient Instructions    Return in about 6 months (around 9/10/2020) for Routine Visit.    Afshin Baptiste MD  [facility]      "

## 2020-03-10 NOTE — NURSING NOTE
Prior to immunization administration, verified patients identity using patient s name and date of birth. Please see Immunization Activity for additional information.     Screening Questionnaire for Adult Immunization    Are you sick today?   No   Do you have allergies to medications, food, a vaccine component or latex?   No   Have you ever had a serious reaction after receiving a vaccination?   No   Do you have a long-term health problem with heart, lung, kidney, or metabolic disease (e.g., diabetes), asthma, a blood disorder, no spleen, complement component deficiency, a cochlear implant, or a spinal fluid leak?  Are you on long-term aspirin therapy?   No   Do you have cancer, leukemia, HIV/AIDS, or any other immune system problem?   No   Do you have a parent, brother, or sister with an immune system problem?   No   In the past 3 months, have you taken medications that affect  your immune system, such as prednisone, other steroids, or anticancer drugs; drugs for the treatment of rheumatoid arthritis, Crohn s disease, or psoriasis; or have you had radiation treatments?   No   Have you had a seizure, or a brain or other nervous system problem?   No   During the past year, have you received a transfusion of blood or blood    products, or been given immune (gamma) globulin or antiviral drug?   No   For women: Are you pregnant or is there a chance you could become       pregnant during the next month?   No   Have you received any vaccinations in the past 4 weeks?   No     Immunization questionnaire answers were all negative.        Per orders of Dr. Baptiste, injection of Influenza and Pneumococcal 23 given by Donna Leyva. Patient instructed to remain in clinic for 15 minutes afterwards, and to report any adverse reaction to me immediately.       Screening performed by Donna Leyva on 3/10/2020 at 4:43 PM.

## 2020-03-11 DIAGNOSIS — Z00.00 MEDICARE ANNUAL WELLNESS VISIT, SUBSEQUENT: ICD-10-CM

## 2020-03-11 DIAGNOSIS — Z11.59 ENCOUNTER FOR HCV SCREENING TEST FOR LOW RISK PATIENT: ICD-10-CM

## 2020-03-11 DIAGNOSIS — Z12.5 SCREENING PSA (PROSTATE SPECIFIC ANTIGEN): ICD-10-CM

## 2020-03-11 DIAGNOSIS — Z13.6 CARDIOVASCULAR SCREENING; LDL GOAL LESS THAN 130: ICD-10-CM

## 2020-03-11 LAB
ALBUMIN SERPL-MCNC: 3.9 G/DL (ref 3.4–5)
ALP SERPL-CCNC: 106 U/L (ref 40–150)
ALT SERPL W P-5'-P-CCNC: 41 U/L (ref 0–70)
ANION GAP SERPL CALCULATED.3IONS-SCNC: 5 MMOL/L (ref 3–14)
AST SERPL W P-5'-P-CCNC: 17 U/L (ref 0–45)
BILIRUB SERPL-MCNC: 0.9 MG/DL (ref 0.2–1.3)
BUN SERPL-MCNC: 12 MG/DL (ref 7–30)
CALCIUM SERPL-MCNC: 9.7 MG/DL (ref 8.5–10.1)
CHLORIDE SERPL-SCNC: 107 MMOL/L (ref 94–109)
CHOLEST SERPL-MCNC: 230 MG/DL
CO2 SERPL-SCNC: 28 MMOL/L (ref 20–32)
CREAT SERPL-MCNC: 0.77 MG/DL (ref 0.66–1.25)
GFR SERPL CREATININE-BSD FRML MDRD: >90 ML/MIN/{1.73_M2}
GLUCOSE SERPL-MCNC: 115 MG/DL (ref 70–99)
HDLC SERPL-MCNC: 40 MG/DL
HGB BLD-MCNC: 18.9 G/DL (ref 13.3–17.7)
LDLC SERPL CALC-MCNC: 160 MG/DL
NONHDLC SERPL-MCNC: 190 MG/DL
POTASSIUM SERPL-SCNC: 5.2 MMOL/L (ref 3.4–5.3)
PROT SERPL-MCNC: 7.6 G/DL (ref 6.8–8.8)
PSA SERPL-ACNC: 10.4 UG/L (ref 0–4)
SODIUM SERPL-SCNC: 140 MMOL/L (ref 133–144)
TRIGL SERPL-MCNC: 149 MG/DL

## 2020-03-11 PROCEDURE — G0103 PSA SCREENING: HCPCS | Performed by: INTERNAL MEDICINE

## 2020-03-11 PROCEDURE — 80053 COMPREHEN METABOLIC PANEL: CPT | Performed by: INTERNAL MEDICINE

## 2020-03-11 PROCEDURE — 36415 COLL VENOUS BLD VENIPUNCTURE: CPT | Performed by: INTERNAL MEDICINE

## 2020-03-11 PROCEDURE — 80061 LIPID PANEL: CPT | Performed by: INTERNAL MEDICINE

## 2020-03-11 PROCEDURE — 85018 HEMOGLOBIN: CPT | Performed by: INTERNAL MEDICINE

## 2020-03-11 PROCEDURE — 86803 HEPATITIS C AB TEST: CPT | Performed by: INTERNAL MEDICINE

## 2020-03-12 LAB — HCV AB SERPL QL IA: NONREACTIVE

## 2020-03-19 ENCOUNTER — TELEPHONE (OUTPATIENT)
Dept: INTERNAL MEDICINE | Facility: CLINIC | Age: 70
End: 2020-03-19

## 2020-03-19 DIAGNOSIS — R97.20 ELEVATED PROSTATE SPECIFIC ANTIGEN (PSA): Primary | ICD-10-CM

## 2020-03-19 NOTE — TELEPHONE ENCOUNTER
Patients OV cancelled for today due to Covid19. Per Dr. Baptiste patient is to see urology to follow up on elevated PSA results. Daughter informed. Please sign pended referral.